# Patient Record
Sex: FEMALE | Race: BLACK OR AFRICAN AMERICAN | NOT HISPANIC OR LATINO | URBAN - METROPOLITAN AREA
[De-identification: names, ages, dates, MRNs, and addresses within clinical notes are randomized per-mention and may not be internally consistent; named-entity substitution may affect disease eponyms.]

---

## 2018-06-21 ENCOUNTER — APPOINTMENT (RX ONLY)
Dept: URBAN - METROPOLITAN AREA CLINIC 88 | Facility: CLINIC | Age: 48
Setting detail: DERMATOLOGY
End: 2018-06-21

## 2018-06-21 DIAGNOSIS — L30.0 NUMMULAR DERMATITIS: ICD-10-CM

## 2018-06-21 DIAGNOSIS — L81.0 POSTINFLAMMATORY HYPERPIGMENTATION: ICD-10-CM

## 2018-06-21 DIAGNOSIS — L30.9 DERMATITIS, UNSPECIFIED: ICD-10-CM

## 2018-06-21 PROBLEM — I10 ESSENTIAL (PRIMARY) HYPERTENSION: Status: ACTIVE | Noted: 2018-06-21

## 2018-06-21 PROBLEM — L20.84 INTRINSIC (ALLERGIC) ECZEMA: Status: ACTIVE | Noted: 2018-06-21

## 2018-06-21 PROCEDURE — 99203 OFFICE O/P NEW LOW 30 MIN: CPT | Mod: 25

## 2018-06-21 PROCEDURE — ? OTHER

## 2018-06-21 PROCEDURE — ? RECOMMENDATIONS

## 2018-06-21 PROCEDURE — ? COUNSELING

## 2018-06-21 PROCEDURE — ? BIOPSY BY PUNCH METHOD

## 2018-06-21 PROCEDURE — 11100: CPT

## 2018-06-21 ASSESSMENT — LOCATION ZONE DERM
LOCATION ZONE: LEG
LOCATION ZONE: TRUNK
LOCATION ZONE: FACE

## 2018-06-21 ASSESSMENT — LOCATION DETAILED DESCRIPTION DERM
LOCATION DETAILED: LEFT MEDIAL BREAST 6-7:00 REGION
LOCATION DETAILED: LEFT ANTERIOR DISTAL THIGH
LOCATION DETAILED: LEFT DISTAL POSTERIOR THIGH
LOCATION DETAILED: RIGHT MENTAL CREASE
LOCATION DETAILED: LEFT ANTERIOR PROXIMAL THIGH
LOCATION DETAILED: RIGHT DISTAL POSTERIOR THIGH

## 2018-06-21 ASSESSMENT — LOCATION SIMPLE DESCRIPTION DERM
LOCATION SIMPLE: LEFT BREAST
LOCATION SIMPLE: LEFT POSTERIOR THIGH
LOCATION SIMPLE: RIGHT POSTERIOR THIGH
LOCATION SIMPLE: LEFT THIGH
LOCATION SIMPLE: CHIN

## 2018-06-21 NOTE — PROCEDURE: BIOPSY BY PUNCH METHOD
Punch Size In Mm: 4
X Size Of Lesion In Cm (Optional): 0
Anesthesia Volume In Cc (Will Not Render If 0): 0.5
Bill 89305 For Specimen Handling/Conveyance To Laboratory?: no
Epidermal Sutures: 4-0 Ethilon
Home Suture Removal Text: Patient was provided a home suture removal kit and will remove their sutures at home.  If they have any questions or difficulties they will call the office.
Anesthesia Type: 1% lidocaine with epinephrine
Lab Facility: 1
Biopsy Type: H and E
Suture Removal: 14 days
Detail Level: Detailed
Notification Instructions: Patient will be notified of biopsy results. However, patient instructed to call the office if not contacted within 2 weeks.
Billing Type: Third-Party Bill
Consent: Written consent was obtained and risks were reviewed including but not limited to scarring, infection, bleeding, scabbing, incomplete removal, nerve damage and allergy to anesthesia.
Dressing: bandage
Was A Bandage Applied: Yes
Post-Care Instructions: I reviewed with the patient in detail post-care instructions. Patient is to keep the biopsy site dry overnight, and then apply bacitracin twice daily until healed. Patient may apply hydrogen peroxide soaks to remove any crusting.
Lab: 3
Hemostasis: None
Wound Care: Petrolatum

## 2018-06-21 NOTE — PROCEDURE: OTHER
Other (Free Text): Favor fixed drug (pt is on lasox and lisinopril and rare Advil use) [facility]
Detail Level: Zone
Note Text (......Xxx Chief Complaint.): This diagnosis correlates with the

## 2018-06-21 NOTE — PROCEDURE: RECOMMENDATIONS
Recommendation Preamble: The following recommendations were made during the visit:
Recommendations (Free Text): Consider hydroquinone once receive biopsy results.
Detail Level: Zone

## 2018-07-12 ENCOUNTER — APPOINTMENT (RX ONLY)
Dept: URBAN - METROPOLITAN AREA CLINIC 88 | Facility: CLINIC | Age: 48
Setting detail: DERMATOLOGY
End: 2018-07-12

## 2018-07-12 DIAGNOSIS — Z48.02 ENCOUNTER FOR REMOVAL OF SUTURES: ICD-10-CM

## 2018-07-12 DIAGNOSIS — L259 CONTACT DERMATITIS AND OTHER ECZEMA, UNSPECIFIED CAUSE: ICD-10-CM

## 2018-07-12 PROBLEM — L30.8 OTHER SPECIFIED DERMATITIS: Status: ACTIVE | Noted: 2018-07-12

## 2018-07-12 PROCEDURE — ? OTHER

## 2018-07-12 PROCEDURE — ? PRESCRIPTION

## 2018-07-12 PROCEDURE — 99213 OFFICE O/P EST LOW 20 MIN: CPT

## 2018-07-12 PROCEDURE — 99024 POSTOP FOLLOW-UP VISIT: CPT

## 2018-07-12 PROCEDURE — ? COUNSELING

## 2018-07-12 PROCEDURE — ? SUTURE REMOVAL (GLOBAL PERIOD)

## 2018-07-12 RX ORDER — HYDROCORTISONE 2.5 %
OINTMENT (GRAM) TOPICAL
Qty: 1 | Refills: 1 | Status: ERX | COMMUNITY
Start: 2018-07-12

## 2018-07-12 RX ORDER — TRIAMCINOLONE ACETONIDE 1 MG/G
OINTMENT TOPICAL
Qty: 1 | Refills: 1 | Status: ERX | COMMUNITY
Start: 2018-07-12

## 2018-07-12 RX ADMIN — Medication: at 12:52

## 2018-07-12 RX ADMIN — TRIAMCINOLONE ACETONIDE: 1 OINTMENT TOPICAL at 12:51

## 2018-07-12 ASSESSMENT — BSA RASH: BSA RASH: 10

## 2018-07-12 ASSESSMENT — LOCATION DETAILED DESCRIPTION DERM: LOCATION DETAILED: LEFT ANTERIOR PROXIMAL THIGH

## 2018-07-12 ASSESSMENT — LOCATION SIMPLE DESCRIPTION DERM: LOCATION SIMPLE: LEFT THIGH

## 2018-07-12 ASSESSMENT — LOCATION ZONE DERM: LOCATION ZONE: LEG

## 2018-07-12 NOTE — PROCEDURE: SUTURE REMOVAL (GLOBAL PERIOD)
Add 75014 Cpt? (Important Note: In 2017 The Use Of 32082 Is Being Tracked By Cms To Determine Future Global Period Reimbursement For Global Periods): yes
Detail Level: Detailed

## 2018-07-12 NOTE — PROCEDURE: OTHER
Note Text (......Xxx Chief Complaint.): This diagnosis correlates with the
Other (Free Text): Patient’s biopsy results are still pending. Rule out nummular dermatitis \\n vs fixed drug due to lisinopril.
Detail Level: Zone